# Patient Record
Sex: MALE | Race: WHITE | NOT HISPANIC OR LATINO | Employment: FULL TIME | URBAN - METROPOLITAN AREA
[De-identification: names, ages, dates, MRNs, and addresses within clinical notes are randomized per-mention and may not be internally consistent; named-entity substitution may affect disease eponyms.]

---

## 2020-04-29 ENCOUNTER — HOSPITAL ENCOUNTER (EMERGENCY)
Facility: HOSPITAL | Age: 35
Discharge: HOME | End: 2020-04-29
Attending: EMERGENCY MEDICINE
Payer: OTHER GOVERNMENT

## 2020-04-29 VITALS
TEMPERATURE: 99 F | HEART RATE: 76 BPM | OXYGEN SATURATION: 99 % | DIASTOLIC BLOOD PRESSURE: 70 MMHG | SYSTOLIC BLOOD PRESSURE: 119 MMHG

## 2020-04-29 DIAGNOSIS — Z20.822 COVID-19 VIRUS NOT DETECTED: Primary | ICD-10-CM

## 2020-04-29 LAB — SARS-COV-2 RDRP RESP QL NAA+PROBE: NEGATIVE

## 2020-04-29 PROCEDURE — 99282 EMERGENCY DEPT VISIT SF MDM: CPT

## 2020-04-29 PROCEDURE — U0002 COVID-19 LAB TEST NON-CDC: HCPCS

## 2020-04-29 NOTE — ED PROVIDER NOTES
Encounter Date: 4/29/2020    SCRIBE #1 NOTE: I, Melania Cortes, am scribing for, and in the presence of, Ashley Gongora PA-C.       History     Chief Complaint   Patient presents with    COVID-19 Concerns     Time seen by provider: 11:55 PM on 04/29/2020    Tyler Fernandez is a 34 y.o. male who presents to the ED with an onset of fatigue, cough, congestion, and lack of appetite. The patient states that his symptoms started 2 days ago. He received mail from an office where the lady tested positive for COVID-19 and reports working people who tested positive for COVID-19. The patient denies chills, fever, diarrhea, or any other symptoms at this time. No PSHx.    The history is provided by the patient.     Review of patient's allergies indicates:  Allergies not on file  No past medical history on file.  No past surgical history on file.  No family history on file.  Social History     Tobacco Use    Smoking status: Not on file   Substance Use Topics    Alcohol use: Not on file    Drug use: Not on file     Review of Systems   Constitutional: Positive for appetite change and fatigue. Negative for activity change, chills and fever.   HENT: Positive for congestion. Negative for rhinorrhea and sore throat.    Eyes: Negative for redness and visual disturbance.   Respiratory: Positive for cough. Negative for chest tightness and shortness of breath.    Cardiovascular: Negative for chest pain.   Gastrointestinal: Negative for abdominal pain, diarrhea, nausea and vomiting.   Genitourinary: Negative for dysuria and frequency.   Musculoskeletal: Negative for back pain, neck pain and neck stiffness.   Skin: Negative for rash.   Neurological: Negative for dizziness, syncope, numbness and headaches.       Physical Exam     Initial Vitals [04/29/20 1143]   BP Pulse Resp Temp SpO2   119/70 76 -- 98.7 °F (37.1 °C) 99 %      MAP       --         Physical Exam    Nursing note and vitals reviewed.  Constitutional: He appears  well-developed and well-nourished. He is cooperative.  Non-toxic appearance. He does not have a sickly appearance.   HENT:   Head: Normocephalic and atraumatic.   Right Ear: Tympanic membrane and external ear normal.   Left Ear: Tympanic membrane and external ear normal.   Nose: Nose normal.   Mouth/Throat: Uvula is midline and oropharynx is clear and moist.   Eyes: Conjunctivae and lids are normal.   Neck: Normal range of motion and full passive range of motion without pain. Neck supple.   Cardiovascular: Normal rate, regular rhythm and normal heart sounds. Exam reveals no gallop and no friction rub.    No murmur heard.  Pulmonary/Chest: Breath sounds normal. He has no wheezes. He has no rhonchi. He has no rales.   Abdominal: Normal appearance.   Neurological: He is alert.   Skin: Skin is warm, dry and intact. No rash noted.         ED Course   Procedures  Labs Reviewed   SARS-COV-2 RNA AMPLIFICATION, QUAL    Narrative:     What symptom criteria does the patient meet?->Cough          Imaging Results    None          Medical Decision Making:   History:   Old Medical Records: I decided to obtain old medical records.  Clinical Tests:   Lab Tests: Ordered and Reviewed       APC / Resident Notes:   Urgent evaluation of a well appearing 34 year old who presents with fatigue.  Vital signs reviewed. No increased work of breathing. Speaking in full sentences. Breath sounds are clear and equal bilaterally. I doubt pneumonia. Rapid testing performed which was negative. Recommend self quarantine. Symptomatic treatment. Discussed results with patient. Return precautions given. Patient is to follow up with their primary care provider.          Scribe Attestation:   Scribe #1: I performed the above scribed service and the documentation accurately describes the services I performed. I attest to the accuracy of the note.                    I, Ashley Gongora PA-C, personally performed the services described in this  documentation. All medical record entries made by the scribe were at my direction and in my presence.  I have reviewed the chart and agree that the record reflects my personal performance and is accurate and complete. Ashley Gongora PA-C.  1:14 PM 04/29/2020        Clinical Impression:       ICD-10-CM ICD-9-CM   1. Covid-19 Virus not Detected [id number]              ED Disposition Condition    Discharge Stable        ED Prescriptions     None        Follow-up Information     Follow up With Specialties Details Why Contact Info    [address] [address]  [phone]      [facility]  As needed [address] [place] [id number] [phone]                                     Ashley Gongora PA-C  04/29/20 3935

## 2020-04-29 NOTE — ED PROVIDER NOTES
Encounter Date: 4/29/2020       History     Chief Complaint   Patient presents with    COVID-19 Concerns     HPI  Review of patient's allergies indicates:  Allergies not on file  No past medical history on file.  No past surgical history on file.  No family history on file.  Social History     Tobacco Use    Smoking status: Not on file   Substance Use Topics    Alcohol use: Not on file    Drug use: Not on file     Review of Systems    Physical Exam     Initial Vitals [04/29/20 1143]   BP Pulse Resp Temp SpO2   119/70 76 -- 98.7 °F (37.1 °C) 99 %      MAP       --         Physical Exam    ED Course   Procedures  Labs Reviewed   SARS-COV-2 RNA AMPLIFICATION, QUAL    Narrative:     What symptom criteria does the patient meet?->Cough          Imaging Results    None                                          Clinical Impression:       ICD-10-CM ICD-9-CM   1. Covid-19 Virus not Detected [id number]          Disposition:   Disposition: Discharged  Condition: Stable     ED Disposition Condition    Discharge Stable        ED Prescriptions     None        Follow-up Information     Follow up With Specialties Details Why Contact Info    [address] [address]  [phone]      [facility]  As needed [address] [place] [id number] [phone]

## 2021-04-29 ENCOUNTER — PATIENT MESSAGE (OUTPATIENT)
Dept: RESEARCH | Facility: HOSPITAL | Age: 36
End: 2021-04-29

## 2024-01-09 ENCOUNTER — OFFICE VISIT (OUTPATIENT)
Dept: URGENT CARE | Facility: CLINIC | Age: 39
End: 2024-01-09
Payer: COMMERCIAL

## 2024-01-09 VITALS
BODY MASS INDEX: 34.44 KG/M2 | RESPIRATION RATE: 16 BRPM | WEIGHT: 246 LBS | DIASTOLIC BLOOD PRESSURE: 71 MMHG | HEIGHT: 71 IN | HEART RATE: 80 BPM | OXYGEN SATURATION: 97 % | SYSTOLIC BLOOD PRESSURE: 108 MMHG | TEMPERATURE: 98 F

## 2024-01-09 DIAGNOSIS — S61.512A LACERATION OF SKIN OF LEFT WRIST, INITIAL ENCOUNTER: Primary | ICD-10-CM

## 2024-01-09 PROCEDURE — 90715 TDAP VACCINE 7 YRS/> IM: CPT | Mod: S$GLB,,,

## 2024-01-09 PROCEDURE — 90471 IMMUNIZATION ADMIN: CPT | Mod: S$GLB,,,

## 2024-01-09 PROCEDURE — 12001 RPR S/N/AX/GEN/TRNK 2.5CM/<: CPT | Mod: S$GLB,,,

## 2024-01-09 PROCEDURE — 99204 OFFICE O/P NEW MOD 45 MIN: CPT | Mod: 25,S$GLB,,

## 2024-01-09 RX ORDER — BUPRENORPHINE AND NALOXONE 8; 2 MG/1; MG/1
FILM, SOLUBLE BUCCAL; SUBLINGUAL
COMMUNITY
Start: 2023-12-29

## 2024-01-09 RX ORDER — CEPHALEXIN 500 MG/1
500 CAPSULE ORAL 4 TIMES DAILY
Qty: 20 CAPSULE | Refills: 0 | Status: SHIPPED | OUTPATIENT
Start: 2024-01-09 | End: 2024-01-14

## 2024-01-09 RX ORDER — MUPIROCIN 20 MG/G
OINTMENT TOPICAL 3 TIMES DAILY
Qty: 30 G | Refills: 0 | Status: SHIPPED | OUTPATIENT
Start: 2024-01-09

## 2024-01-09 NOTE — PROGRESS NOTES
"Subjective:      Patient ID: Tyler Fernandez is a 38 y.o. male.    Vitals:  height is 5' 11" (1.803 m) and weight is 111.6 kg (246 lb). His temperature is 97.7 °F (36.5 °C). His blood pressure is 108/71 and his pulse is 80. His respiration is 16 and oxygen saturation is 97%.     Chief Complaint: Laceration    Pt cleaning AC unit at home and  slipped causing laceration on LT forearm. Bleeding well controlled at time of exam. Unsure of last tetanus shot. Tetanus updated at this visit.    Laceration   The incident occurred 1 to 3 hours ago. The laceration is located on the Left arm. The laceration is 11-20 cm in size. The laceration mechanism was a metal edge. The pain is moderate. The pain has been Constant since onset. He reports no foreign bodies present. His tetanus status is unknown.       Constitution: Negative.   HENT: Negative.     Cardiovascular: Negative.    Respiratory: Negative.     Gastrointestinal: Negative.    Musculoskeletal:  Positive for pain.   Skin:  Positive for laceration (left wrist). Negative for erythema.   Allergic/Immunologic: Negative for immunizations up-to-date (updating tetanus today).   Neurological: Negative.  Negative for numbness and tingling.   Psychiatric/Behavioral: Negative.        Objective:     Physical Exam   Constitutional: He is oriented to person, place, and time. He appears well-developed.   HENT:   Head: Normocephalic and atraumatic. Head is without abrasion, without contusion and without laceration.   Ears:   Right Ear: External ear normal.   Left Ear: External ear normal.   Nose: Nose normal.   Mouth/Throat: Oropharynx is clear and moist and mucous membranes are normal.   Eyes: Conjunctivae, EOM and lids are normal.   Neck: Trachea normal and phonation normal. Neck supple.   Cardiovascular: Normal rate.   Pulmonary/Chest: Effort normal. No respiratory distress.   Musculoskeletal: Normal range of motion.         General: Normal range of motion.      Left wrist: He exhibits " laceration.      Right hand: Motor /Testing: The patient has normal right wrist strength.      Left hand: Motor /Testing: The patient has normal left wrist strength.        Hands:    Neurological: He is alert and oriented to person, place, and time. He displays no weakness.   Skin: Skin is warm, dry, intact and no rash. Capillary refill takes less than 2 seconds. Lacerations - upper ext.:  left wristNo abrasion, No burn, No bruising, No erythema and No ecchymosis   Psychiatric: His speech is normal and behavior is normal. Mood, judgment and thought content normal.   Nursing note and vitals reviewed.      Assessment:     1. Laceration of skin of left wrist, initial encounter        Plan:       Laceration of skin of left wrist, initial encounter  -     (In Office Administered) Tdap Vaccine  -     mupirocin (BACTROBAN) 2 % ointment; Apply topically 3 (three) times daily.  Dispense: 30 g; Refill: 0  -     cephALEXin (KEFLEX) 500 MG capsule; Take 1 capsule (500 mg total) by mouth 4 (four) times daily. for 5 days  Dispense: 20 capsule; Refill: 0  -     Laceration Repair      Tetanus updated this visit    Patient to return in 7 days for suture removal.    Discussed medication with patient who acknowledges understanding and is agreeable to POC. Follow up with primary care. Increase fluid intake. Red flags for ER discussed.

## 2024-01-10 NOTE — PATIENT INSTRUCTIONS
Return in 7 days for suture removal    Mupirocin ointment 2 to 3 times a day on clean and dry skin.    Keep area clean and dry and do not soak in water of any kind including bath tubs, Rivers, lakes.      Oral antibiotics as prescribed     Your tetanus has been updated today with a Tdap injection.      Monitor area for warmth, redness, or drainage and return to clinic with any concerns.

## 2024-01-10 NOTE — PROCEDURES
"Laceration Repair    Date/Time: 2024 5:00 PM    Performed by: Aletha Michael NP  Authorized by: Aletha Michael NP  Consent Done: Yes  Consent: Verbal consent obtained.  Risks and benefits: risks, benefits and alternatives were discussed  Consent given by: patient  Patient understanding: patient states understanding of the procedure being performed  Patient consent: the patient's understanding of the procedure matches consent given  Procedure consent: procedure consent matches procedure scheduled  Patient identity confirmed: , name and verbally with patient  Time out: Immediately prior to procedure a "time out" was called to verify the correct patient, procedure, equipment, support staff and site/side marked as required.  Body area: upper extremity  Location details: left wrist  Laceration length: 2 cm  Foreign bodies: no foreign bodies  Tendon involvement: none  Nerve involvement: none  Vascular damage: no  Anesthesia: local infiltration    Anesthesia:  Local Anesthetic: lidocaine 1% without epinephrine  Anesthetic total: 2 mL  Preparation: Patient was prepped and draped in the usual sterile fashion.  Irrigation solution: saline  Irrigation method: syringe  Amount of cleaning: standard  Debridement: none  Degree of undermining: none  Skin closure: 4-0 nylon  Number of sutures: 5  Technique: simple  Approximation: close  Approximation difficulty: simple  Dressing: antibiotic ointment, non-stick sterile dressing, dressing applied and adhesive bandage  Patient tolerance: Patient tolerated the procedure well with no immediate complications        "

## 2024-01-17 ENCOUNTER — OFFICE VISIT (OUTPATIENT)
Dept: URGENT CARE | Facility: CLINIC | Age: 39
End: 2024-01-17
Payer: COMMERCIAL

## 2024-01-17 VITALS
HEART RATE: 63 BPM | HEIGHT: 71 IN | DIASTOLIC BLOOD PRESSURE: 78 MMHG | BODY MASS INDEX: 34.86 KG/M2 | RESPIRATION RATE: 16 BRPM | SYSTOLIC BLOOD PRESSURE: 109 MMHG | TEMPERATURE: 97 F | OXYGEN SATURATION: 97 % | WEIGHT: 249 LBS

## 2024-01-17 DIAGNOSIS — Z48.02 VISIT FOR SUTURE REMOVAL: Primary | ICD-10-CM

## 2024-01-17 PROCEDURE — 99024 POSTOP FOLLOW-UP VISIT: CPT | Mod: S$GLB,,, | Performed by: NURSE PRACTITIONER

## 2024-01-17 PROCEDURE — 99213 OFFICE O/P EST LOW 20 MIN: CPT | Mod: S$GLB,,, | Performed by: NURSE PRACTITIONER

## 2024-01-17 NOTE — PROGRESS NOTES
"Subjective:      Patient ID: Tyler Fernandez is a 38 y.o. male.    Vitals:  height is 5' 11" (1.803 m) and weight is 112.9 kg (249 lb). His oral temperature is 97.3 °F (36.3 °C). His blood pressure is 109/78 and his pulse is 63. His respiration is 16 and oxygen saturation is 97%.     Chief Complaint: Suture / Staple Removal      Tyler Fernandez is a 38 year old male presenting to the clinic for suture removal. He had a laceration to the left wrist that was repaired at this urgent care on 1-9-24. He has had no drainage from the wound and reports taking all prescribed antibiotics.     Constitution: Negative.   HENT: Negative.     Neck: neck negative.   Cardiovascular: Negative.    Respiratory: Negative.     Gastrointestinal: Negative.    Genitourinary: Negative.    Musculoskeletal: Negative.    Skin:  Positive for wound (repaired laceration to left wrist). Negative for color change and erythema.    Objective:     Physical Exam   Constitutional: He is oriented to person, place, and time. He appears well-developed. He is cooperative.   HENT:   Head: Normocephalic and atraumatic.   Ears:   Right Ear: Hearing, tympanic membrane, external ear and ear canal normal.   Left Ear: Hearing, tympanic membrane, external ear and ear canal normal.   Nose: Nose normal. No mucosal edema or nasal deformity. No epistaxis. Right sinus exhibits no maxillary sinus tenderness and no frontal sinus tenderness. Left sinus exhibits no maxillary sinus tenderness and no frontal sinus tenderness.   Mouth/Throat: Uvula is midline, oropharynx is clear and moist and mucous membranes are normal. No trismus in the jaw. Normal dentition. No uvula swelling.   Eyes: Conjunctivae and lids are normal.   Neck: Trachea normal and phonation normal. Neck supple.   Cardiovascular: Normal rate.   Pulmonary/Chest: Effort normal.   Abdominal: Normal appearance.   Musculoskeletal: Normal range of motion.         General: Normal range of motion.   Neurological: He is " alert and oriented to person, place, and time. He exhibits normal muscle tone.   Skin: Skin is warm, dry and intact. No erythema        Psychiatric: His speech is normal and behavior is normal. Judgment and thought content normal.   Nursing note and vitals reviewed.    Assessment:     1. Visit for suture removal        Plan:   Sutures removed without difficulty. No surrounding erythema or signs of secondary infection. Steri strips applied and patient instructed on wound care. Follow up as needed.       Visit for suture removal

## 2024-01-17 NOTE — PROGRESS NOTES
Tyler Fernandez is a 38 year old male presenting to the clinic for suture removal. He had a laceration to the left wrist that was repaired at this urgent care on 1-9-24. He has had no drainage from the wound and reports taking all prescribed antibiotics.

## 2024-01-17 NOTE — PROCEDURES
Suture Removal    Date/Time: 1/17/2024 5:50 PM  [facility]    Performed by: Nelly Larsen NP  Authorized by: Nelly Larsen NP  Body area: upper extremity  Location details: left wrist  Wound Appearance: clean, well healed, nontender, normal color and no drainage  Sutures Removed: 4  Post-removal: Steri-Strips applied  Facility: sutures placed in this facility  Patient tolerance: Patient tolerated the procedure well with no immediate complications